# Patient Record
Sex: FEMALE | Race: WHITE | URBAN - METROPOLITAN AREA
[De-identification: names, ages, dates, MRNs, and addresses within clinical notes are randomized per-mention and may not be internally consistent; named-entity substitution may affect disease eponyms.]

---

## 2017-08-17 ENCOUNTER — OFFICE VISIT (OUTPATIENT)
Dept: URGENT CARE | Age: 42
End: 2017-08-17

## 2017-08-17 VITALS
WEIGHT: 197 LBS | RESPIRATION RATE: 16 BRPM | HEIGHT: 67 IN | TEMPERATURE: 98.7 F | HEART RATE: 90 BPM | SYSTOLIC BLOOD PRESSURE: 138 MMHG | BODY MASS INDEX: 30.92 KG/M2 | DIASTOLIC BLOOD PRESSURE: 90 MMHG

## 2017-08-17 DIAGNOSIS — L02.91 ABSCESS: Primary | ICD-10-CM

## 2017-08-17 PROCEDURE — 10060 I&D ABSCESS SIMPLE/SINGLE: CPT | Performed by: PHYSICIAN ASSISTANT

## 2017-08-17 PROCEDURE — 99203 OFFICE O/P NEW LOW 30 MIN: CPT | Performed by: PHYSICIAN ASSISTANT

## 2017-08-17 RX ORDER — TRAMADOL HYDROCHLORIDE 50 MG/1
50 TABLET ORAL EVERY 6 HOURS PRN
Qty: 15 TABLET | Refills: 0 | Status: SHIPPED | OUTPATIENT
Start: 2017-08-17 | End: 2017-08-27

## 2017-08-17 RX ORDER — CEPHALEXIN 500 MG/1
500 CAPSULE ORAL 3 TIMES DAILY
Qty: 21 CAPSULE | Refills: 0 | Status: SHIPPED | OUTPATIENT
Start: 2017-08-17 | End: 2017-08-24

## 2017-08-17 RX ORDER — SULFAMETHOXAZOLE AND TRIMETHOPRIM 800; 160 MG/1; MG/1
1 TABLET ORAL 2 TIMES DAILY
COMMUNITY

## 2023-01-09 ENCOUNTER — HOSPITAL ENCOUNTER (EMERGENCY)
Age: 48
Discharge: ANOTHER HOSPITAL | End: 2023-01-09
Attending: STUDENT IN AN ORGANIZED HEALTH CARE EDUCATION/TRAINING PROGRAM

## 2023-01-09 VITALS
TEMPERATURE: 98.9 F | SYSTOLIC BLOOD PRESSURE: 178 MMHG | HEART RATE: 92 BPM | BODY MASS INDEX: 32.14 KG/M2 | HEIGHT: 66 IN | OXYGEN SATURATION: 96 % | DIASTOLIC BLOOD PRESSURE: 97 MMHG | RESPIRATION RATE: 18 BRPM | WEIGHT: 199.96 LBS

## 2023-01-09 DIAGNOSIS — H33.22 LEFT RETINAL DETACHMENT: Primary | ICD-10-CM

## 2023-01-09 LAB
CHP ED QC CHECK: YES
GLUCOSE BLD-MCNC: 222 MG/DL
GLUCOSE BLD-MCNC: 222 MG/DL (ref 70–99)
PERFORMED ON: ABNORMAL

## 2023-01-09 PROCEDURE — 99282 EMERGENCY DEPT VISIT SF MDM: CPT

## 2023-01-09 ASSESSMENT — ENCOUNTER SYMPTOMS
RESPIRATORY NEGATIVE: 1
GASTROINTESTINAL NEGATIVE: 1

## 2023-01-09 ASSESSMENT — PAIN - FUNCTIONAL ASSESSMENT: PAIN_FUNCTIONAL_ASSESSMENT: NONE - DENIES PAIN

## 2023-01-09 NOTE — ED PROVIDER NOTES
ED Attending Attestation Note     Date of evaluation: 1/9/2023    This patient was seen by the resident. I have seen and examined the patient, agree with the workup, evaluation, management and diagnosis. The care plan has been discussed. I have reviewed the ECG and concur with the resident's interpretation. Briefly, this is a patient with no known medical history who presents with a cute onset of left eye blurry vision and floaters. States she has a 10-month history of similar symptoms in the right eye which had acute onset and she was told it may have been an infection but never followed up with ophthalmology and has been unable to see any other medical provider due to lack of insurance. No recent trauma. Describes her initial floaters as \"seeing smoke \"and then now has diffuse blurry vision. On my examination, she is awake, alert and in no acute distress. She has a normal neurologic exam including cranial nerves, normal pronator drift, normal gait with the exception of very decreased vision in bilateral eyes without obvious field cut. I personally performed the slit-lamp exam which bilaterally revealed no corneal clouding, no evidence for conjunctivitis. Pupils are equal round and reactive bilaterally without any cell and flare. Funduscopic exam bilaterally was noted to have areas of pallor along the retina. On the left side, it is most notable along the medial aspect of the retina. Bedside ultrasound was performed and although is not an official diagnostic exam, is concerning for retinal detachment which would be consistent with her described history and other physical exam maneuvers.   As such, patient warrants emergent ophthalmology evaluation and therefore will be transferred to [address] [place] [address],[address], MD  01/09/23 7826

## 2023-01-09 NOTE — ED PROVIDER NOTES
[id number] [facility] RESIDENT NOTE       Date of evaluation: 1/9/2023    Chief Complaint     Eye Problem (Pt was at work driving and left eye blurred. Pt states she has been having issues with right eye blurriness, but hasn't seen eye doctor d/t not having insurance. Pt states both eyes are now equally blurry.)      History of Present Illness     Valeri Green is a 52 y.o. female who presents to [facility] ED with complaints of acute onset blurry vision that began this mroning in her left eye. The pt states that she was driving to work when she began to see floaters in her left eye and then it became very blurry. Pt has a hx of right sided blurry vision that began in March after she states she got the covid vaccine. Pt further states that back  in march she went to see an eye doctor about her right eye and they said it was conjunctivitis and she was prescribed eye drops. The blurry vision did not improve but was reassured that it would take a while to heal. This morning is when the left eye became blurry as well. The pt has no Pmhx but did note that she hasnt had insurance for a while so hasnt seen a PCP for multiple years. Pt states that she is not in any pain, does not have a headache, SOB, or chest pain. Review of Systems     Review of Systems   Constitutional: Negative. HENT: Negative. Eyes:  Positive for visual disturbance. Blurry vision bilaterally; r side old L side acute onset   Respiratory: Negative. Cardiovascular: Negative. Gastrointestinal: Negative. Genitourinary: Negative. Musculoskeletal: Negative. Neurological: Negative. Past Medical, Surgical, Family, and Social History     She has no past medical history on file. She has a past surgical history that includes Tubal ligation; Cholecystectomy; and Nasal septum surgery. Her family history is not on file. She reports that she has been smoking cigarettes.  She has been smoking an average of .5 packs per day. She does not have any smokeless tobacco history on file. She reports current alcohol use. She reports current drug use. Drug: Marijuana Codey Harrington). Medications     Previous Medications    SULFAMETHOXAZOLE-TRIMETHOPRIM (BACTRIM DS) 800-160 MG PER TABLET    Take 1 tablet by mouth 2 times daily       Allergies     She is allergic to penicillins. Physical Exam     INITIAL VITALS: BP: (!) 178/97, Temp: 98.9 °F (37.2 °C), Heart Rate: 92, Resp: 18, SpO2: 96 %   Physical Exam  Vitals and nursing note reviewed. Constitutional:       General: She is in acute distress. HENT:      Head: Normocephalic and atraumatic. Eyes:      General: Visual field deficit present. Comments: Bilateral blurry vision  Possible retinal detachment on U/S   Cardiovascular:      Rate and Rhythm: Normal rate and regular rhythm. Pulses: Normal pulses. Heart sounds: Normal heart sounds. Pulmonary:      Effort: Pulmonary effort is normal.      Breath sounds: Normal breath sounds. Abdominal:      General: Abdomen is flat. Palpations: Abdomen is soft. Skin:     General: Skin is warm and dry. Neurological:      General: No focal deficit present. Mental Status: She is alert. Psychiatric:         Attention and Perception: Attention normal.         Mood and Affect: Mood is anxious. Speech: Speech normal.         Behavior: Behavior is cooperative. DiagnosticResults       RADIOLOGY:  No orders to display       LABS:   Results for orders placed or performed during the hospital encounter of 01/09/23   POCT Glucose   Result Value Ref Range    Glucose 222 mg/dL    QC OK?  Yes    POCT Glucose   Result Value Ref Range    POC Glucose 222 (H) 70 - 99 mg/dl    Performed on ACCU-CHEK        ED BEDSIDE ULTRASOUND:  Retinal Ultrasound performed    RECENT VITALS:  BP: (!) 178/97, Temp: 98.9 °F (37.2 °C), Heart Rate: 92,Resp: 18, SpO2: 96 %     Procedures       ED Course Nursing Notes, Past Medical Hx, Past Surgical Hx, Social Hx, Allergies, and Family Hx were reviewed. The patient was given the following medications:  No orders of the defined types were placed in this encounter. CONSULTS:  Johnny3 [facility] Colleen / RICKI / Compa Mikael is a 52 y.o. female who presents to [facility] ED with complaints of acute onset blurry vision that began this mroning in her left eye. The pt states that she was driving to work when she began to see floaters in her left eye and then it became very blurry. Pt has a hx of right sided blurry vision that began in March after she states she got the covid vaccine. Pt further states that back  in march she went to see an eye doctor about her right eye and they said it was conjunctivitis and she was prescribed eye drops. The blurry vision did not improve but was reassured that it would take a while to heal. This morning is when the left eye became blurry as well. The pt has no Pmhx but did note that she hasnt had insurance for a while so hasnt seen a PCP for multiple years. Pt states that she is not in any pain, does not have a headache, SOB, or chest pain. On exam, the pt is hypertensive with a normal heart rate. She is afebrile and sitting in a chair in acute distress over her lack of vision. She states that she can make out figures but cannot see the specific colors or words on it until they are extremely close to her face. Slit lamp exam was done which did not show abnormalities in the anterior chamber. On retinal ultrasound there were signs of potential retinal detachement bilaterally with scarring on the right eye. Contacting [facility] for further recommendations. The pt has not seen a PCP in years due to issues with insurance and came in with a blood glucose of 222.  The decision has been made to transfer this patient to [facility] ED and have spoken to Dr. Wilian Burgess in the ED as well as the ophthalmologist who are expecting her. This patient was also evaluated by the attending physician. All care plans were discussed and agreed upon. Clinical Impression     1. Left retinal detachment        Disposition     PATIENT REFERRED TO:  No follow-up provider specified.     DISCHARGE MEDICATIONS:  New Prescriptions    No medications on file       DISPOSITION Decision To Transfer 01/09/2023 04:18:14 PM       Silvano Larsen DO  Resident  01/09/23 [id number]

## 2023-01-10 NOTE — ED NOTES
Pt left with family in private vehicle prior to EMTALA transfer being completed. Report given to Li Claudio at [facility].      Holly Duncan RN  01/09/23 2055       Holly Duncan RN  01/09/23 [id number]